# Patient Record
Sex: MALE | Race: WHITE | Employment: FULL TIME | URBAN - METROPOLITAN AREA
[De-identification: names, ages, dates, MRNs, and addresses within clinical notes are randomized per-mention and may not be internally consistent; named-entity substitution may affect disease eponyms.]

---

## 2018-08-07 ENCOUNTER — OFFICE VISIT (OUTPATIENT)
Dept: ORTHOPEDIC SURGERY | Age: 33
End: 2018-08-07

## 2018-08-07 VITALS
DIASTOLIC BLOOD PRESSURE: 62 MMHG | RESPIRATION RATE: 16 BRPM | WEIGHT: 244.8 LBS | HEART RATE: 84 BPM | OXYGEN SATURATION: 100 % | SYSTOLIC BLOOD PRESSURE: 144 MMHG | TEMPERATURE: 97.8 F

## 2018-08-07 DIAGNOSIS — M46.1 BILATERAL SACROILIITIS (HCC): Primary | ICD-10-CM

## 2018-08-07 RX ORDER — PREDNISONE 10 MG/1
TABLET ORAL
Qty: 21 TAB | Refills: 0 | Status: CANCELLED | OUTPATIENT
Start: 2018-08-07

## 2018-08-07 NOTE — PATIENT INSTRUCTIONS
Sacroiliac Joint Pain: Care Instructions  Your Care Instructions    The sacroiliac joints connect the spine and each side of the pelvis. These joints bear the weight and stress of your torso. This makes them easy to injure. Injury or overuse of these joints may cause low back pain. Stress on these joints can cause joint pain. Sacroiliac joint pain is more common in pregnant women. Certain kinds of arthritis also may cause this type of joint pain. Home treatment may help you feel better. So can avoiding activities that stress your back. Your doctor also may recommend physical therapy. This may include doing exercises and stretches to help with pain. You may also learn to use good posture. Follow-up care is a key part of your treatment and safety. Be sure to make and go to all appointments, and call your doctor if you are having problems. It's also a good idea to know your test results and keep a list of the medicines you take. How can you care for yourself at home? · Ask your doctor about light exercises that may help your back pain. Try to do light activity throughout the day. But make sure to take rests as needed. Find a comfortable position for rest, but don't stay in one position for too long. Avoid activities that cause pain. · To apply heat, put a warm water bottle, a heating pad set on low, or a warm cloth on your back. Do not go to sleep with a heating pad on your skin. · Put ice or a cold pack on your back for 10 to 20 minutes at a time. Put a thin cloth between the ice and your skin. · If the doctor gave you a prescription medicine for pain, take it as prescribed. · If you are not taking a prescription pain medicine, ask your doctor if you can take an over-the-counter pain medicine, such as acetaminophen (Tylenol), ibuprofen (Advil, Motrin), or naproxen (Aleve). Read and follow all instructions on the label. Take pain medicines exactly as directed.   · Do not take two or more pain medicines at the same time unless the doctor told you to. Many pain medicines have acetaminophen, which is Tylenol. Too much acetaminophen (Tylenol) can be harmful. · To prevent future back pain, do exercises to stretch and strengthen your back and stomach. Learn how to use good posture, safe lifting techniques, and proper body mechanics. When should you call for help? Call 911 anytime you think you may need emergency care. For example, call if:    · You are unable to move a leg at all.   [facility] your doctor now or seek immediate medical care if:    · You have new or worse symptoms in your legs or buttocks. Symptoms may include:  ¨ Numbness or tingling. ¨ Weakness. ¨ Pain.     · You lose bladder or bowel control.    Watch closely for changes in your health, and be sure to contact your doctor if:    · You are not getting better as expected. Where can you learn more? Go to http://leonaBBC Easyyanelis.info/. Enter T074 in the search box to learn more about \"Sacroiliac Joint Pain: Care Instructions. \"  Current as of: November 29, 2017  Content Version: 11.7  © 8280-5009 [employer]. Care instructions adapted under license by [employer] (which disclaims liability or warranty for this information). If you have questions about a medical condition or this instruction, always ask your healthcare professional. [place] [address] any warranty or liability for your use of this information. Sacroiliac Pain: Exercises  Your Care Instructions  Here are some examples of typical rehabilitation exercises for your condition. Start each exercise slowly. Ease off the exercise if you start to have pain. Your doctor or physical therapist will tell you when you can start these exercises and which ones will work best for you. How to do the exercises  Knee-to-chest stretch    1. Do not do the knee-to-chest exercise if it causes or increases back or leg pain.   2. Lie on your back with your knees bent and your feet flat on the floor. You can put a small pillow under your head and neck if it is more comfortable. 3. Grasp your hands under one knee and bring the knee to your chest, keeping the other foot flat on the floor. 4. Keep your lower back pressed to the floor. Hold for at least 15 to 30 seconds. 5. Relax and lower the knee to the starting position. Repeat with the other leg. 6. Repeat 2 to 4 times with each leg. 7. To get more stretch, keep your other leg flat on the floor while pulling your knee to your chest.  Bridging    1. Lie on your back with both knees bent. Your knees should be bent about 90 degrees. 2. Tighten your belly muscles by pulling in your belly button toward your spine. Then push your feet into the floor, squeeze your buttocks, and lift your hips off the floor until your shoulders, hips, and knees are all in a straight line. 3. Hold for about 6 seconds as you continue to breathe normally, and then slowly lower your hips back down to the floor and rest for up to 10 seconds. 4. Repeat 8 to 12 times. Hip extension    1. Get down on your hands and knees on the floor. 2. Keeping your back and neck straight, lift one leg straight out behind you. When you lift your leg, keep your hips level. Don't let your back twist, and don't let your hip drop toward the floor. 3. Hold for 6 seconds. Repeat 8 to 12 times with each leg. 4. If you feel steady and strong when you do this exercise, you can make it more difficult. To do this, when you lift your leg, also lift the opposite arm straight out in front of you. For example, lift the left leg and the right arm at the same time. (This is sometimes called the \"bird dog exercise. \") Hold for 6 seconds, and repeat 8 to 12 times on each side. Clamshell    1. Lie on your side with a pillow under your head. Keep your feet and knees together and your knees bent. 2. Raise your top knee, but keep your feet together.  Do not let your hips roll back. Your legs should open up like a clamshell. 3. Hold for 6 seconds. 4. Slowly lower your knee back down. Rest for 10 seconds. 5. Repeat 8 to 12 times. 6. Switch to your other side and repeat steps 1 through 5. Hamstring wall stretch    1. Lie on your back in a doorway, with one leg through the open door. 2. Slide your affected leg up the wall to straighten your knee. You should feel a gentle stretch down the back of your leg. 1. Do not arch your back. 2. Do not bend either knee. 3. Keep one heel touching the floor and the other heel touching the wall. Do not point your toes. 3. Hold the stretch for at least 1 minute to begin. Then try to lengthen the time you hold the stretch to as long as 6 minutes. 4. Switch legs, and repeat steps 1 through 3.  5. Repeat 2 to 4 times. 6. If you do not have a place to do this exercise in a doorway, there is another way to do it:  7. Lie on your back, and bend one knee. 8. Loop a towel under the ball and toes of that foot, and hold the ends of the towel in your hands. 9. Straighten your knee, and slowly pull back on the towel. You should feel a gentle stretch down the back of your leg. 10. Switch legs, and repeat steps 1 through 3.  11. Repeat 2 to 4 times. Lower abdominal strengthening    1. Lie on your back with your knees bent and your feet flat on the floor. 2. Tighten your belly muscles by pulling your belly button in toward your spine. 3. Lift one foot off the floor and bring your knee toward your chest, so that your knee is straight above your hip and your leg is bent like the letter \"L. \"  4. Lift the other knee up to the same position. 5. Lower one leg at a time to the starting position. 6. Keep alternating legs until you have lifted each leg 8 to 12 times. 7. Be sure to keep your belly muscles tight and your back still as you are moving your legs. Be sure to breathe normally. Piriformis stretch    1.  Lie on your back with your legs straight. 2. Lift your affected leg, and bend your knee. With your opposite hand, reach across your body, and then gently pull your knee toward your opposite shoulder. 3. Hold the stretch for 15 to 30 seconds. 4. Switch legs and repeat steps 1 through 3.  5. Repeat 2 to 4 times. Follow-up care is a key part of your treatment and safety. Be sure to make and go to all appointments, and call your doctor if you are having problems. It's also a good idea to know your test results and keep a list of the medicines you take. Where can you learn more? Go to http://leona-yanelis.info/. Enter N496 in the search box to learn more about \"Sacroiliac Pain: Exercises. \"  Current as of: November 29, 2017  Content Version: 11.7  © 3015-3124 [employer]. Care instructions adapted under license by [employer] (which disclaims liability or warranty for this information). If you have questions about a medical condition or this instruction, always ask your healthcare professional. [address] any warranty or liability for your use of this information.

## 2018-08-07 NOTE — PROGRESS NOTES
[facility]  [address]  [place], [address] [place] [address] Street  Phone: [phone]  Fax: [phone]        Desmond Mcclendon  : 1985  PCP: No primary care provider on file. 2018    NEW PATIENT      ASSESSMENT AND PLAN     Duke Alfaro comes in to the office today c/o axial low back pain following an injury on  when he lifted a trailer. He was evaluated at [facility], where they suspected his pain was likely due to a muscle spasm. His symptoms have not improved much since the injury. His pain worsens movement or lying on the ground and is relieved with sitting. He also has some pain in his abdomen/groin that feels like \"a stomach ache. \" He rates his pain as a 4/10 today. On examination, he has pain reproduced with lumbar extension. He is neurologically intact. He had no dural tension sign. He had tenderness to palpation of his SI joints bilaterally. He had 2/5 positive provocative testing for sacroiliitis. His pain is likely sacroiliitis bilaterally. The referral pattern would explain his groin symptoms and exacerbation with extension. We discussed the option of PT vs SI joint injection. I offered a prednisone dose pack, but he defers at this time. I referred to PT. Pt will f/u in 6 weeks or sooner if needed. Diagnoses and all orders for this visit:    1. Bilateral sacroiliitis ([facility] [address]  -     REFERRAL TO PHYSICAL THERAPY       Follow-up Disposition: Not on File    CHIEF COMPLAINT  Duke Alfaro is seen today in consultation at the request of No primary care provider on file. for complaints of axial low back pain. HISTORY OF PRESENT ILLNESS  Duke Alfaro is a 29 y.o. male c/o axial low back pain after an injury in . , he lifted a trailer and \"threw out [his] back. \" He went to [facility] for evaluation who took an XR and stated it was likely a muscle spasm.  Since his pain has only slightly improved since the injury, he would like further evaluation. He also has some pain in his abdomen/groin that feels like \"a stomach ache. \"    He notes he feels more comfortable lying on his side in bed, but lying on the floor while playing with his kids worsens the pain. He finds relief from sitting in a supportive chair. He has pain when he has bowel movements, but notes the pain is not hindering his function. He has no hx of back pain prior to the injury. He works as a . Pt denies any fevers, chills, nausea, vomiting. Pt denies any chest pain and shortness of breath. Pt denies any ear, nose, and throat problems. Pt denies any fecal or urinary incontinence. PAST MEDICAL HISTORY   No past medical history on file. No past surgical history on file. MEDICATIONS        ALLERGIES  Allergies not on file       SOCIAL HISTORY    Social History     Social History    Marital status: UNKNOWN     Spouse name: N/A    Number of children: N/A    Years of education: N/A     Social History Main Topics    Smoking status: Not on file    Smokeless tobacco: Not on file    Alcohol use Not on file    Drug use: Not on file    Sexual activity: Not on file     Other Topics Concern    Not on file     Social History Narrative       FAMILY HISTORY  No family history on file. REVIEW OF SYSTEMS  Review of Systems   Musculoskeletal: Positive for back pain. PHYSICAL EXAMINATION  There were no vitals taken for this visit. No flowsheet data found. Constitutional:  Well developed, well nourished, in no acute distress. Psychiatric: Affect and mood are appropriate. HEENT: Normocephalic, atraumatic. Extraocular movements intact. Integumentary: No rashes or abrasions noted on exposed areas. Cardiovascular: Regular rate and rhythm. Pulmonary: Clear to auscultation bilaterally. SPINE/MUSCULOSKELETAL EXAM    Cervical spine:  Neck is midline. Normal muscle tone. No focal atrophy is noted.    ROM pain free. Shoulder ROM intact. No tenderness to palpation. Negative Spurling's sign. Negative Tinel's sign. Negative Moulton's sign. Sensation in the bilateral arms grossly intact to light touch. Lumbar spine:  No rash, ecchymosis, or gross obliquity. No fasciculations. No focal atrophy is noted. No pain with hip ROM. Full range of motion. No tenderness to palpation. No tenderness to palpation at the sciatic notch. SI joints tender bilaterally. Trochanters non tender. Sensation in the bilateral legs grossly intact to light touch. Positive CHAVA test on the R>L. Negative P4 bilaterally. Negative distraction test.  Mildly positive compression test.  Negative Gaenslen test bilaterally. MOTOR:      Biceps  Triceps Deltoids Wrist Ext Wrist Flex Hand Intrin   Right 5/5 5/5 5/5 5/5 5/5 5/5   Left 5/5 5/5 5/5 5/5 5/5 5/5             Hip Flex  Quads Hamstrings Ankle DF EHL Ankle PF   Right 5/5 5/5 5/5 5/5 5/5 5/5   Left 5/5 5/5 5/5 5/5 5/5 5/5     DTRs are 2+ biceps, triceps, brachioradialis, patella, and Achilles. Negative Straight Leg raise. Squat not tested. No difficulty with tandem gait. Ambulation without assistive device. FWB. RADIOGRAPHS  5V Lumbar XR images taken on 7/3/18 personally reviewed with patient:  There is slight disc space narrowing at L4-5 and L5-S1. No acute abnormality.  reviewed    Mr. Husam Hogue has a reminder for a \"due or due soon\" health maintenance. I have asked that he contact his primary care provider for follow-up on this health maintenance. 20 minutes of face-to-face contact were spent with the patient during today's visit extensively discussing symptoms and treatment plan. All questions were answered. More than half of this visit today was spent on counseling. Written by Ej Lay, as dictated by Dr. Elmira Kearney. I, Dr. Elmira Kearney, confirm that all documentation is accurate.

## 2018-08-08 ENCOUNTER — DOCUMENTATION ONLY (OUTPATIENT)
Dept: ORTHOPEDIC SURGERY | Age: 33
End: 2018-08-08

## 2018-08-08 NOTE — PROGRESS NOTES
Pend: PATIENT WILL CALL THE OFFICE WITH HIS W/C INFORMATION PER ANJELICA MCGRATH.  08/08/2018 [email or web address] NOTE  PRIMARY HOLD  Informational message attached either in claim notes or by remit. [INFORM]  CALLED PATIENT AT [phone]. HE STATES HE GAVE CONTACT INFORMATION TO SOMEONE ELSE YESTERDAY??? HIS MANAGER IS GOING TO CALL ME TOMORROW. HE HAS MY DIRECT PHONE NUMBER.  Emery Yepez

## 2018-08-13 ENCOUNTER — HOSPITAL ENCOUNTER (OUTPATIENT)
Dept: PHYSICAL THERAPY | Age: 33
Discharge: HOME | End: 2018-08-13
Payer: OTHER MISCELLANEOUS

## 2018-08-13 PROCEDURE — 97110 THERAPEUTIC EXERCISES: CPT

## 2018-08-13 PROCEDURE — 97161 PT EVAL LOW COMPLEX 20 MIN: CPT

## 2018-08-13 NOTE — PROGRESS NOTES
PT DAILY TREATMENT NOTE/LUMBAR EVAL 3-16    Patient Name: Zak Jo  Date:2018  : 1985  [x]  Patient  Verified  Payor: Kelli Lock / Plan: [id number] [facility] / Product Type: Workers Comp /    In [employer] time:358  Total Treatment Time (min): 40  Total Timed Codes (min): 8  1:1 Treatment Time ( only): 40   Visit #: 1 of 10    Treatment Area: Sacroiliitis ([facility] [address] [M46.1]  SUBJECTIVE  Pain Level (0-10 scale): 2-3  [x]constant []intermittent [x]improving []worsening []no change since onset  WORSE with movement , leaning backward, standing, leaning forward holding weight, BETTER having my back supported, ibuprofin, heat  Any medication changes, allergies to medications, adverse drug reactions, diagnosis change, or new procedure performed?: [x] No    [] Yes (see summary sheet for update)  Subjective functional status/changes:     PLOF:I all areas, no AD use,I ADLS and activities, household chores, work demands, community activities  Limitations to PLOF: pain  Mechanism of Injury: work related injury 18 picking up too much weight and incorrectly  Current symptoms/Complaints: 30 YO male diagnosed as above and with S/S consistent with above diagnosis presents to skilled outpatient PT. CCO lower back pain, mostly along the base of the spine. Sometimes it transfers to the front to the groin area faintly and in between the pelvic region. Onset 18 picking up too much weight and incorrectly while at work.  Pain today is 2-3/10     Previous Treatment/Compliance: [facility],  [address], x rays, medication, heat,   PMHx/Surgical Hx: none  Work Hx: [address] [facility], [address] [place] [address],   Living Situation: lives in a 1 story house, 2-3 steps to enter, not alone  Pt Goals: strengthen my back  Barriers: [x]pain []financial []time []transportation []other  Motivation: good  Substance use: []Alcohol []Tobacco []other:   FABQ Score: []low []elevate  Cognition: A & O x 4 Other: OBJECTIVE/EXAMINATION  Domestic Life: household chores, work demands, community activities  Activity/Recreational Limitations: pain   Mobility: I   Self Care: I         Modality rationale:     Min Type Additional Details    [] Estim:  []Unatt       []IFC  []Premod                        []Other:  []w/ice   []w/heat  Position:  Location:    [] Estim: []Att    []TENS instruct  []NMES                    []Other:  []w/US   []w/ice   []w/heat  Position:  Location:    []  Traction: [] Cervical       []Lumbar                       [] Prone          []Supine                       []Intermittent   []Continuous Lbs:  [] before manual  [] after manual    []  Ultrasound: []Continuous   [] Pulsed                           []1MHz   []3MHz Location:  W/cm2:    []  Iontophoresis with dexamethasone         Location: [] Take home patch   [] In clinic    []  Ice     []  heat  []  Ice massage  []  Laser   []  Anodyne Position:  Location:    []  Laser with stim  []  Other: Position:  Location:    []  Vasopneumatic Device Pressure:       [] lo [] med [] hi   Temperature: [] lo [] med [] hi   [] Skin assessment post-treatment:  []intact []redness- no adverse reaction    []redness  adverse reaction:     32 min [x]Eval                  []Re-Eval       8 min Therapeutic Exercise:  [x] See flow sheet :   Rationale: increase ROM, increase strength and improve coordination to improve the patients ability to aid with increase tolerance to ADLs and activities     min Therapeutic Activity:  []  See flow sheet :   Rationale:   to improve the patients ability to       min Neuromuscular Re-education:  []  See flow sheet :   Rationale:   to improve the patients ability to      min Manual Therapy:     Rationale:  to      min Gait Training:  ___ feet with ___ device on level surfaces with ___ level of assist   Rationale:           With   [] TE   [] TA   [] neuro   [] other: Patient Education: [x] Review HEP    [] Progressed/Changed HEP based on:   [] positioning   [] body mechanics   [] transfers   [] heat/ice application    [] other:      Other Objective/Functional Measures:    Physical Therapy Evaluation - Lumbar Spine ([facility])    SUBJECTIVE  Chief Complaint:    Mechanism of injury:    Symptoms:  Aggravated by:   [] Bending [] Sitting [] Standing [] Walking   [] Moving [] Cough [] Sneeze [] Valsalva   [] AM  [] PM  Lying:  [] sup   [] pro   [] sidelying   [] Other:     Eased by:    [] Bending [] Sitting [] Standing [] Walking   [] Moving [] AM  [] PM  Lying: [] sup  [] pro  [] sidelying   [] Other:     General Health:  Red Flags Indicated? [] Yes    [] No  [] Yes [] No Recent weight change (If yes, due to dieting?  [] Yes  [] No)   [] Yes [] No Weakness in legs during walking  [] Yes [] No Unremitting pain at night  [] Yes [] No Abdominal pain or problems  [] Yes [] No Rectal bleeding  [] Yes [] No Feet more cold or painful in cold weather  [] Yes [] No Menstrual irregularities  [] Yes [] No Blood or pain with urination  [] Yes [] No Dysfunction of bowel or bladder  [] Yes [] No Recent illness within past 3 weeks (i.e, cold, flu)  [] Yes [] No Numbness/tingling in buttock/genitalia region    Past History/Treatments:     Diagnostic Tests: [] Lab work [x] X-rays    [] CT [] MRI     [] Other:  Results:    Functional Status  Prior level of function:as above  Present functional limitations:FOTO   What position do you sleep in?: SL    OBJECTIVE  Posture:  Lateral Shift: [x] R    [x] L     [] +  [x] -  Kyphosis: [] Increased [] Decreased   []  WNL  Lordosis:  [] Increased [] Decreased   [x] WNL  Pelvic symmetry: [x] WNL    [] Other:minimal decrease left inonimant mobility with FF    Gait:  [x] Normal     [] Abnormal:    Active Movements: [] N/A   [] Too acute   [] Other:  ROM  AROM % PROM Comments:pain, area   Forward flexion 40-60 11 cm     Extension 20-30 15 degrees      SB right 20-30 45 cm     SB left 20-30 51 cm     Rotation right 5-10 75%  Pain Left rot > right rot   Rotation left 5-10 100%  Pain left rot > right rot     Repeated Movements   Effects on present pain: produces ([place]), abolishes (A), increases (incr), decreases (decr), centralizes (C), peripheral (PH), no effect (NE)   Pre-Test Sx Flexion Repeated Flexion Extension Repeated Extension Repeated SBL Repeated SBR   Sitting          Standing          Lying      N/A N/A   Comments:  Side Glide:  Sustained passive positioning test:    Neuro Screen [] WNL  Myotome/Dermatome/Reflexes:  Comments:    Dural Mobility:  SLR Sitting: [] R    [] L    [] +    [] -  @ (degrees):           Supine: [] R    [] L    [] +    [] -  @ (degrees):   Slump Test: [x] R    [x] L    [] +    [x] -  @ (degrees):   Prone Knee Bend: [] R    [] L    [] +    [] -     Palpation  [] Min  [x] Mod  [] Severe    Location:Left SIJ  [] Min  [x] Mod  [] Severe    Location:STC Left SIJ, Superior glut   [] Min  [] Mod  [] Severe    Location:    Strength   L(0-5) R (0-5) N/T   Hip Flexion (L1,2)   []   Knee Extension (L3,4)   []   Ankle Dorsiflexion (L4)   []   Great Toe Extension (L5)   []   Ankle Plantarflexion (S1)   []   Knee Flexion (S1,2)   []   Upper Abdominals   []   Lower Abdominals   []   Paraspinals   []   Back Rotators   []   Gluteus Miguel   []   Other   []     Special Tests  Lumbar:  Lumb.  Compression: [] Pos  [] Neg               Lumbar Distraction:   [] Pos  [] Neg    Quadrant:  [] Pos  [] Neg   [] Flex  [] Ext    Sacroilliac:  Gaenslen's: [] R    [] L    [] +    [] -     Compression: [] +    [] -     Gapping:  [] +    [] -     Thigh Thrust: [] R    [] L    [] +    [] -     Leg Length: [] +    [] -   Position:    Crests:    ASIS:    PSIS:    Sacral Sulcus:    Mobility: Standing flex:     Sitting flex:     Supine to sit:     Prone knee bend:         Hip: Rolm Nba:  [] R    [] L    [] +    [] -     Scour:  [] R    [] L    [] +    [] -     Piriformis: [x] R    [x] L    [] +    [x] -          Deficits: Scott's: [] R    [] L    [] + [] -     [place]: [] R    [] L    [] +    [] -     Hamstrings 90/90:    Gastrocsoleus (to neutral): Right: Left:       Global Muscular Weakness:  Abdominals:  Quadratus Lumborum:  Paraspinals: Other:    Other tests/comments:       Pain Level (0-10 scale) post treatment: 2-3  ASSESSMENT/Changes in Function: Patient demonstrates the potential to make gains with improved ROM, strength, endurance/activity tolerance, functional FOTO survey score  and all within a reasonable time frame so as to increase their functional independence with ADLs and activities for carryover to  Improve quality of life, tolerance to household chores and community activities. Patient requires skilled Physical Therapy so as to monitor their response to and modify their treatment plan accordingly. Patient appears to be an appropriate candidate for skilled outpatient Physical Therapy. Patient will continue to benefit from skilled PT services to modify and progress therapeutic interventions, address ROM deficits, address strength deficits, analyze and address soft tissue restrictions, analyze and cue movement patterns, analyze and modify body mechanics/ergonomics, assess and modify postural abnormalities and instruct in home and community integration to attain remaining goals.      [x]  See Plan of Care  []  See progress note/recertification  []  See Discharge Summary         Progress towards goals / Updated goals:       PLAN  [x]  Upgrade activities as tolerated     [x]  Continue plan of care  []  Update interventions per flow sheet       []  Discharge due to:_  []  Other:_      Kandace Darling, PT 8/13/2018  3:19 PM

## 2018-08-13 NOTE — PROGRESS NOTES
[facility] [address], [address] [place] [address], [address],[place] [address]  [phone] fax      Plan of Care/ Statement of Necessity for Physical Therapy Services    Patient name: Indra Mccallum Start of Care: 2018   Referral source: Hannah Foote MD : 1985    Medical Diagnosis: Sacroiliitis ([facility] [address] [M46.1]   Onset Date:18    Treatment Diagnosis: decrease tolerance to ADLs and activities due to LBP, decrease trunk ROM, decrease core strength, STC Left > right LS   Prior Hospitalization: see medical history Provider#: [id number]   Medications: Verified on Patient summary List    Comorbidities: none   Prior Level of Function: :I all areas, no AD use,I ADLS and activities, household chores, work demands, community activities     The Plan of Care and following information is based on the information from the initial evaluation. Assessment/ key information: 28 YO male diagnosed as above and with S/S consistent with above diagnosis presents to skilled outpatient PT. CCO lower back pain, mostly along the base of the spine. Sometimes it transfers to the front to the groin area faintly and in between the pelvic region. Onset 18 picking up too much weight and incorrectly while at work.  Pain today is 2-3/10  Previous Treatment/Compliance: [facility],  [address], x rays, medication, heat,   Pain 2-3, - lateral shift, Lordosis WNL, Pelvic symmetry WNL with minimal decrease left inonimant rotation with FF, gait is normal, no AD use, FOTO 61,  TRUNK AROM  FF 11 cm, BB 15 degrees, ROT Right 75% left 100%, SB right 45 cm, left 51 cm,  Pain with left rotation > right rotation.- B slump sit,  - B piriformis tension, Patient demonstrates the potential to make gains with improved ROM, strength, endurance/activity tolerance, functional FOTO survey score  and all within a reasonable time frame so as to increase their functional independence with ADLs and activities for carryover to  Improve quality of life, tolerance to household chores and community activities. Patient requires skilled Physical Therapy so as to monitor their response to and modify their treatment plan accordingly. Patient appears to be an appropriate candidate for skilled outpatient Physical Therapy.     Evaluation Complexity History LOW Complexity : Zero comorbidities / personal factors that will impact the outcome / POC; Examination MEDIUM Complexity : 3 Standardized tests and measures addressing body structure, function, activity limitation and / or participation in recreation  ;Presentation LOW Complexity : Stable, uncomplicated  ;Clinical Decision Making MEDIUM Complexity : FOTO score of 26-74  Overall Complexity Rating: LOW   Problem List: pain affecting function, decrease ROM, decrease strength, decrease ADL/ functional abilitiies, decrease activity tolerance, decrease flexibility/ joint mobility and other FOTO 61   Treatment Plan may include any combination of the following: Therapeutic exercise, Therapeutic activities, Neuromuscular re-education, Physical agent/modality, Manual therapy, Patient education and Home safety training  Patient / Family readiness to learn indicated by: asking questions, trying to perform skills and interest  Persons(s) to be included in education: patient (P)  Barriers to Learning/Limitations: None  Patient Goal (s): strengthen my back  Patient Self Reported Health Status: good  Rehabilitation Potential: good    Short Term Goals: To be accomplished in 5 treatments:   1 patient will have established and be I with HEP to aid with progression of skilled PT program   EVAL issued   CURRENT   2 patient will have FOTO improved to 66 to show increase tolerance to work demands   EVAL 66   CURRENT     Long Term Goals:  To be accomplished in 10 treatments:   1 patient will have FOTO improved to 72 to show increase tolerance to work demands   EVAL 66   CURRENT   2 patient will have pain 0-1/10 to aid with increase tolerance to ADLs and activities at home and work   EVAL 2-3   CURRENT   3 patient will have overall 50% improvement per his reports to aid with increase tolerance to work demands and household chores   EVAL pain with standing and moving and leaning backward   CURRENT  Frequency / Duration: Patient to be seen 2-3 times per week for 10 treatments. Patient/ Caregiver education and instruction: Diagnosis, prognosis, self care, activity modification and exercises   [x]  Plan of care has been reviewed with EAGLE Landrum, PT 8/13/2018 3:19 PM  ________________________________________________________________________    I certify that the above Therapy Services are being furnished while the patient is under my care. I agree with the treatment plan and certify that this therapy is necessary.     Physician's Signature:____________Date:_________TIME:________    [employer], Date and Time must be completed for valid certification **      Please sign and return to [facility] . Erica Mcdonaldawdeep [address] [place] [address], [address] [place] [address],[facility] [address]  [phone] fax

## 2018-08-17 ENCOUNTER — HOSPITAL ENCOUNTER (OUTPATIENT)
Dept: PHYSICAL THERAPY | Age: 33
Discharge: HOME | End: 2018-08-17
Payer: OTHER MISCELLANEOUS

## 2018-08-17 PROCEDURE — 97110 THERAPEUTIC EXERCISES: CPT

## 2018-08-17 NOTE — PROGRESS NOTES
PT DAILY TREATMENT NOTE - [facility]     Patient Name: Bhavesh Mcdonald  Date:2018  : 1985  [x]  Patient  Verified  Payor: [employer] / Plan: [id number] / Product Type: Workers Comp /    In [employer] time:3:15  Total Treatment Time (min): 45  Total Timed Codes (min): 45  Visit #: 2 of 10    [facility] [M46.1]    SUBJECTIVE  Pain Level (0-10 scale): 0  Any medication changes, allergies to medications, adverse drug reactions, diagnosis change, or new procedure performed?: [x] No    [] Yes (see summary sheet for update)  Subjective functional status/changes:   [] No changes reported  Reports having no pain today    OBJECTIVE          45 min Therapeutic Exercise:  [x] See flow sheet :   Rationale: increase ROM, increase strength and increase proprioception to improve the patients ability to perform daily household chores. With   [] TE   [] TA   [] neuro   [] other: Patient Education: [x] Review HEP    [] Progressed/Changed HEP based on:   [] positioning   [] body mechanics   [] transfers   [] heat/ice application    [] other:      Other Objective/Functional Measures: Tolerated initiation of therex well with no changes in symptoms     Pain Level (0-10 scale) post treatment: 0    ASSESSMENT/Changes in Function: Patient continues to show improvements with signs/ symptoms however still demonstrates a decrease in strength, deficits with balance and an increase in pain with functional activities. Patient will continue to benefit from skilled PT services to modify and progress therapeutic interventions, address functional mobility deficits, address strength deficits, analyze and cue movement patterns and analyze and modify body mechanics/ergonomics to attain remaining goals.      [x]  See Plan of Care  []  See progress note/recertification  []  See Discharge Summary         Progress towards goals / Updated goals:  Short Term Goals: To be accomplished in 5 treatments:                         1 patient will have established and be I with HEP to aid with progression of skilled PT program                         EVAL issued                         CURRENT: MET- pain with extension exercise 8/17/18                         2 patient will have FOTO improved to 66 to show increase tolerance to work demands                         EVAL 66                         CURRENT                           Long Term Goals:  To be accomplished in 10 treatments:                         1 patient will have FOTO improved to 72 to show increase tolerance to work demands                         EVAL 66                         CURRENT                         2 patient will have pain 0-1/10 to aid with increase tolerance to ADLs and activities at home and work                         EVAL 2-3                         CURRENT                         3 patient will have overall 50% improvement per his reports to aid with increase tolerance to work demands and household chores                         EVAL pain with standing and moving and leaning backward                         CURRENT    PLAN  []  Upgrade activities as tolerated     [x]  Continue plan of care  []  Update interventions per flow sheet       []  Discharge due to:_  []  Other:_      Randy Peoples PT 8/17/2018  8:04 AM    Future Appointments  Date Time Provider Gay Goodman   8/17/2018 2:30 PM Randy Peoples PT [facility]   8/21/2018 12:00 PM Vy Andujar, PTA [facility]   8/23/2018 11:00 AM Randy Peoples PT [facility]   8/27/2018 7:30 AM Vy Andujar, PTA [facility]   8/30/2018 3:00 PM Vy Andujar PTA [facility]

## 2018-08-21 ENCOUNTER — APPOINTMENT (OUTPATIENT)
Dept: PHYSICAL THERAPY | Age: 33
End: 2018-08-21
Payer: OTHER MISCELLANEOUS

## 2018-08-22 ENCOUNTER — HOSPITAL ENCOUNTER (OUTPATIENT)
Dept: PHYSICAL THERAPY | Age: 33
Discharge: HOME | End: 2018-08-22
Payer: OTHER MISCELLANEOUS

## 2018-08-22 ENCOUNTER — APPOINTMENT (OUTPATIENT)
Dept: PHYSICAL THERAPY | Age: 33
End: 2018-08-22

## 2018-08-22 PROCEDURE — 97110 THERAPEUTIC EXERCISES: CPT

## 2018-08-22 PROCEDURE — 97140 MANUAL THERAPY 1/> REGIONS: CPT

## 2018-08-22 NOTE — PROGRESS NOTES
PT DAILY TREATMENT NOTE     Patient Name: Placido Leo  Date:2018  : 1985  [x]  Patient  Verified  Payor: Whitney Mckeon / Plan: [id number] / Product Type: Workers Comp /    In time:4:10  Out time:5:00  Total Treatment Time (min):49   Visit #: 3 of 10    Treatment Area: Sacroiliitis ([address] [M46.1]    SUBJECTIVE  Pain Level (0-10 scale): 2  Any medication changes, allergies to medications, adverse drug reactions, diagnosis change, or new procedure performed?: [x] No    [] Yes (see summary sheet for update)  Subjective functional status/changes:   [] No changes reported  Little pain.     OBJECTIVE    Modality rationale: decrease edema, decrease inflammation, decrease pain and increase tissue extensibility to improve the patients ability to perform ADL    Min Type Additional Details    [] Estim:  []Unatt       []IFC  []Premod                        []Other:  []w/ice   []w/heat  Position:  Location:    [] Estim: []Att    []TENS instruct  []NMES                    []Other:  []w/US   []w/ice   []w/heat  Position:  Location:    []  Traction: [] Cervical       []Lumbar                       [] Prone          []Supine                       []Intermittent   []Continuous Lbs:  [] before manual  [] after manual    []  Ultrasound: []Continuous   [] Pulsed                           []1MHz   []3MHz W/cm2:  Location:    []  Iontophoresis with dexamethasone         Location: [] Take home patch   [] In clinic    []  Ice     []  heat  []  Ice massage  []  Laser   []  Anodyne Position:  Location:    []  Laser with stim  []  Other:  Position:  Location:    []  Vasopneumatic Device Pressure:       [] lo [] med [] hi   Temperature: [] lo [] med [] hi   [x] Skin assessment post-treatment:  [x]intact []redness- no adverse reaction    []redness  adverse reaction:      min []Eval                  []Re-Eval       41 min Therapeutic Exercise:  [x] See flow sheet :   Rationale: increase ROM and increase strength to improve the patients ability to perform ADL     min Therapeutic Activity:  []  See flow sheet :   Rationale:   to improve the patients ability to       min Neuromuscular Re-education:  []  See flow sheet :   Rationale:   to improve the patients ability to     8 min Manual Therapy:  P/A mob  (B) LSP   Rationale: decrease pain, increase ROM, increase tissue extensibility and decrease edema  to perform ADL     min Gait Training:  ___ feet with ___ device on level surfaces with ___ level of assist   Rationale: With   [x] TE   [] TA   [] neuro   [] other: Patient Education: [x] Review HEP    [] Progressed/Changed HEP based on:   [] positioning   [] body mechanics   [] transfers   [] heat/ice application    [] other:      Other Objective/Functional Measures:  Pain  When  Seated  Unsupported/prolonged  standing    Pain Level (0-10 scale) post treatment: 0    ASSESSMENT/Changes in Function:  Tightness and  TTP  At  (B) LSP. Overall fair response  To each there ex. Patient will continue to benefit from skilled PT services to address functional mobility deficits, address ROM deficits, address strength deficits, analyze and address soft tissue restrictions, analyze and cue movement patterns and instruct in home and community integration to attain remaining goals.      [x]  See Plan of Care  []  See progress note/recertification  []  See Discharge Summary         Progress towards goals / Updated goals:  Short Term Goals: To be accomplished in 5 treatments:                         7 patient will have established and be I with HEP to aid with progression of skilled PT program                         EVAL issued                         [id number]: MET- pain with extension exercise 8/17/18                         2 patient will have FOTO improved to 66 to show increase tolerance to work demands                         EVAL 12 [facility] be accomplished in 10 treatments:                         3 patient will have FOTO improved to 72 to show increase tolerance to work demands                         EVAL [phone] patient will have pain 0-1/10 to aid with increase tolerance to ADLs and activities at home and work                         EVAL 2-3                         CURRENT  2  8/22/18                         3 patient will have overall 50% improvement per his reports to aid with increase tolerance to work demands and household chores                         EVAL pain with standing and moving and leaning backward                         CURRENT    PLAN  []  Upgrade activities as tolerated     []  Continue plan of care  []  Update interventions per flow sheet       []  Discharge due to:_  []  Other:_      Lynn Mckinley PTA 8/22/2018  4:16 PM    Future Appointments  Date Time Provider Gay Goodman   8/27/2018 7:30 AM Vy Andujar PTA [facility]   8/30/2018 3:00 PM EAGLE Silva [facility]

## 2018-08-23 ENCOUNTER — APPOINTMENT (OUTPATIENT)
Dept: PHYSICAL THERAPY | Age: 33
End: 2018-08-23
Payer: OTHER MISCELLANEOUS

## 2018-08-27 ENCOUNTER — HOSPITAL ENCOUNTER (OUTPATIENT)
Dept: PHYSICAL THERAPY | Age: 33
Discharge: HOME | End: 2018-08-27
Payer: OTHER MISCELLANEOUS

## 2018-08-27 PROCEDURE — 97110 THERAPEUTIC EXERCISES: CPT

## 2018-08-27 NOTE — PROGRESS NOTES
PT DAILY TREATMENT NOTE     Patient Name: Jyoti More  Date:2018  : 1985  [x]  Patient  Verified  Payor: Eun Province / Plan: [id number] [place] [address] Avenue / Product Type: Workers Comp /    In [facility]  Total Treatment Time (min): 49  Visit #: 4 of 10    Treatment [facility]: [facility] [M46.1]    SUBJECTIVE  Pain Level (0-10 scale): 0  Any medication changes, allergies to medications, adverse drug reactions, diagnosis change, or new procedure performed?: [x] No    [] Yes (see summary sheet for update)  Subjective functional status/changes:   [] No changes reported  Felt  Good  Since  Last  Session.     OBJECTIVE    Modality rationale: decrease edema, decrease inflammation, decrease pain and increase tissue extensibility to improve the patients ability to perform ADL    Min Type Additional Details    [] Estim:  []Unatt       []IFC  []Premod                        []Other:  []w/ice   []w/heat  Position:  Location:    [] Estim: []Att    []TENS instruct  []NMES                    []Other:  []w/US   []w/ice   []w/heat  Position:  Location:    []  Traction: [] Cervical       []Lumbar                       [] Prone          []Supine                       []Intermittent   []Continuous Lbs:  [] before manual  [] after manual    []  Ultrasound: []Continuous   [] Pulsed                           []1MHz   []3MHz W/cm2:  Location:    []  Iontophoresis with dexamethasone         Location: [] Take home patch   [] In clinic   10 [x]  Ice  post   []  heat  []  Ice massage  []  Laser   []  Anodyne Position:long  sit  Location:(B) LSP    []  Laser with stim  []  Other:  Position:  Location:    []  Vasopneumatic Device Pressure:       [] lo [] med [] hi   Temperature: [] lo [] med [] hi   [x] Skin assessment post-treatment:  [x]intact []redness- no adverse reaction    []redness  adverse reaction:      min []Eval                  []Re-Eval       39 min Therapeutic Exercise:  [x] See flow sheet :   Rationale: increase ROM and increase strength to improve the patients ability to perform ADL      min Therapeutic Activity:  []  See flow sheet :   Rationale:   to improve the patients ability to       min Neuromuscular Re-education:  []  See flow sheet :   Rationale:   to improve the patients ability to      min Manual Therapy:     Rationale: decrease pain, increase ROM, increase tissue extensibility and decrease edema  to perform ADL      min Gait Training:  ___ feet with ___ device on level surfaces with ___ level of assist   Rationale: With   [x] TE   [] TA   [] neuro   [] other: Patient Education: [x] Review HEP    [] Progressed/Changed HEP based on:   [] positioning   [] body mechanics   [] transfers   [] heat/ice application    [] other:      Other Objective/Functional Measures:  Pt  Experienced  Popping  At  Right  Hip  With LBR    Pain Level (0-10 scale) post treatment: 0    ASSESSMENT/Changes in Function: OVerall fair  Response  To each there ex. Patient will continue to benefit from skilled PT services to address functional mobility deficits, address ROM deficits, address strength deficits, analyze and address soft tissue restrictions, analyze and cue movement patterns and instruct in home and community integration to attain remaining goals.      [x]  See Plan of Care  []  See progress note/recertification  []  See Discharge Summary         Progress towards goals / Updated goals:  Short Term Goals: To be accomplished in 5 treatments:                         7 patient will have established and be I with HEP to aid with progression of skilled PT program                         EVAL issued                         [id number]: MET- pain with extension exercise 8/17/18                         2 patient will have FOTO improved to 66 to show increase tolerance to work demands                         EVAL [facility] be accomplished in 10 treatments:                         0 patient will have FOTO improved to 72 to show increase tolerance to work demands                         EVAL [id number] [id number] patient will have pain 0-1/10 to aid with increase tolerance to ADLs and activities at home and work                         EVAL 2-3                         CURRENT  2  8/22/18   0   8/27/18                         3 patient will have overall 50% improvement per his reports to aid with increase tolerance to work demands and household chores                         EVAL pain with standing and moving and leaning backward                         CURRENT    PLAN  []  Upgrade activities as tolerated     []  Continue plan of care  []  Update interventions per flow sheet       []  Discharge due to:_  []  Other:_      Frantz Cormier PTA 8/27/2018  7:45 AM    Future Appointments  Date Time Provider Gay Goodman   8/30/2018 3:00 PM Vy Farmer PTA [facility]

## 2018-08-30 ENCOUNTER — HOSPITAL ENCOUNTER (OUTPATIENT)
Dept: PHYSICAL THERAPY | Age: 33
Discharge: HOME | End: 2018-08-30
Payer: OTHER MISCELLANEOUS

## 2018-08-30 PROCEDURE — 97110 THERAPEUTIC EXERCISES: CPT

## 2018-08-30 NOTE — PROGRESS NOTES
PT DAILY TREATMENT NOTE  Patient Name: Placido Leo 
Date:2018 : 1985 [x]  Patient  Verified Payor: Whitney Mckeon / Plan: [address] / Product Type: Workers Comp / In time: 3:08  Out time:3:55 Total Treatment Time (min): 44 Visit #: 5 of 10 Treatment Area: Sacroiliitis ([address] [M46.1] SUBJECTIVE Pain Level (0-10 scale):0 Any medication changes, allergies to medications, adverse drug reactions, diagnosis change, or new procedure performed?: [x] No    [] Yes (see summary sheet for update) Subjective functional status/changes:   [] No changes reported No pain. OBJECTIVE Modality rationale: decrease edema, decrease inflammation, decrease pain and increase tissue extensibility to improve the patients ability to perform ADL Min Type Additional Details  
 [] Estim:  []Unatt       []IFC  []Premod []Other:  []w/ice   []w/heat Position: Location:  
 [] Estim: []Att    []TENS instruct  []NMES []Other:  []w/US   []w/ice   []w/heat Position: Location:  
 []  Traction: [] Cervical       []Lumbar 
                     [] Prone          []Supine []Intermittent   []Continuous Lbs: 
[] before manual 
[] after manual  
 []  Ultrasound: []Continuous   [] Pulsed []1MHz   []3MHz W/cm2: 
Location:  
 []  Iontophoresis with dexamethasone Location: [] Take home patch  
[] In clinic  
10 [x]  Ice post    []  heat 
[]  Ice massage 
[]  Laser  
[]  Anodyne Position:long sit Location:(B) LSP []  Laser with stim 
[]  Other:  Position: Location:  
 []  Vasopneumatic Device Pressure:       [] lo [] med [] hi  
Temperature: [] lo [] med [] hi  
[x] Skin assessment post-treatment:  [x]intact []redness- no adverse reaction 
  []redness  adverse reaction:  
 
 min []Eval                  []Re-Eval  
 
 
34 min Therapeutic Exercise:  [x] See flow sheet :  
 Rationale: increase ROM and increase strength to improve the patients ability to perform  ADL  
 
 min Therapeutic Activity:  []  See flow sheet :  
Rationale:   to improve the patients ability to  
  
 min Neuromuscular Re-education:  []  See flow sheet :  
Rationale:   to improve the patients ability to  
 
 min Manual Therapy:    
Rationale: decrease pain, increase ROM, increase tissue extensibility and decrease edema  to perform ADL  
 
 min Gait Training:  ___ feet with ___ device on level surfaces with ___ level of assist  
Rationale: With 
 [x] TE 
 [] TA 
 [] neuro 
 [] other: Patient Education: [x] Review HEP [] Progressed/Changed HEP based on:  
[] positioning   [] body mechanics   [] transfers   [] heat/ice application   
[] other:   
 
Other Objective/Functional Measures:  Pt  Questionable  About  Right  LSP  Popping  When he  Performs  Hip flexion  movement Pain Level (0-10 scale) post treatment: 0 
 
ASSESSMENT/Changes in Function: Responded  Fairly well  To  Each there ex. Patient will continue to benefit from skilled PT services to address functional mobility deficits, address ROM deficits, address strength deficits, analyze and address soft tissue restrictions, analyze and cue movement patterns and instruct in home and community integration to attain remaining goals. [x]  See Plan of Care 
[]  See progress note/recertification 
[]  See Discharge Summary Progress towards goals / Updated goals: 
Short Term Goals: To be accomplished in 5 treatments: 
                       3 patient will have established and be I with HEP to aid with progression of skilled PT program 
                       EVAL issued 
                       [id number]: MET- pain with extension exercise 8/17/18 
                       2 patient will have FOTO improved to 66 to show increase tolerance to work demands 
                       EVAL 66 
                       CURRENT 
   
 Long Term Goals: To be accomplished in 10 treatments: 
                       3 patient will have FOTO improved to 72 to show increase tolerance to work demands 
[phone] patient will have pain 0-1/10 to aid with increase tolerance to ADLs and activities at home and work 
                       EVAL 2-3 
[id number]  8/22/18   0   8/27/18 
                       3 patient will have overall 50% improvement per his reports to aid with increase tolerance to work demands and household chores                        EVAL pain with standing and moving and leaning backward 
                       CURRENT 
 
PLAN 
[]  Upgrade activities as tolerated     []  Continue plan of care 
[]  Update interventions per flow sheet      
[]  Discharge due to:_ 
[x]  Other:_  Trial TM  Or  AMbulation EMMA Andujar, [place]AG[place] 8/30/2018  3:19 PM 
 
No future appointments.

## 2018-09-04 ENCOUNTER — HOSPITAL ENCOUNTER (OUTPATIENT)
Dept: PHYSICAL THERAPY | Age: 33
Discharge: HOME | End: 2018-09-04
Payer: OTHER MISCELLANEOUS

## 2018-09-04 PROCEDURE — 97110 THERAPEUTIC EXERCISES: CPT

## 2018-09-04 NOTE — PROGRESS NOTES
PT DAILY TREATMENT NOTE  Patient Name: Jazmine Lu 
Date:2018 : 1985 [x]  Patient  Verified Payor: Lyn Maria Del Rosario / Plan: [address] / Product Type: Workers Comp / In time: 3:10  Out time: 4:02 Total Treatment Time (min): 52 Visit #: 6 of 10 Treatment Area: Sacroiliitis ([address] [M46.1] SUBJECTIVE Pain Level (0-10 scale): 1 Any medication changes, allergies to medications, adverse drug reactions, diagnosis change, or new procedure performed?: [x] No    [] Yes (see summary sheet for update) Subjective functional status/changes:   [] No changes reported Pt reports he is doing better overall. Pt reports having some pain at times. OBJECTIVE Modality rationale: decrease inflammation and decrease pain to improve the patients ability to perform ADLs with less pain Min Type Additional Details  
 [] Estim:  []Unatt       []IFC  []Premod []Other:  []w/ice   []w/heat Position: Location:  
 [] Estim: []Att    []TENS instruct  []NMES []Other:  []w/US   []w/ice   []w/heat Position: Location:  
 []  Traction: [] Cervical       []Lumbar 
                     [] Prone          []Supine []Intermittent   []Continuous Lbs: 
[] before manual 
[] after manual  
 []  Ultrasound: []Continuous   [] Pulsed []1MHz   []3MHz W/cm2: 
Location:  
 []  Iontophoresis with dexamethasone Location: [] Take home patch  
[] In clinic  
10 [x]  Ice     []  heat 
[]  Ice massage 
[]  Laser  
[]  Anodyne Position:long sitting Location:l/s  
 []  Laser with stim 
[]  Other:  Position: Location:  
 []  Vasopneumatic Device Pressure:       [] lo [] med [] hi  
Temperature: [] lo [] med [] hi  
[x] Skin assessment post-treatment:  [x]intact []redness- no adverse reaction 
  []redness  adverse reaction:  
 
42 min Therapeutic Exercise:  [x] See flow sheet :  
 Rationale: increase ROM and increase strength to improve the patients ability to perform  ADLs With 
 [x] TE 
 [] TA 
 [] neuro 
 [] other: Patient Education: [x] Review HEP [] Progressed/Changed HEP based on:  
[] positioning   [] body mechanics   [] transfers   [] heat/ice application   
[] other:   
 
Other Objective/Functional Measures: Attempted TM walking today to improve endurance. Added 2# weights to marches, green tband to hipx3, lateral green tband walks, increased weight to 130#s on leg press, deadbug, bridges to improve strength and stability in the core, back, and LEs. Pain Level (0-10 scale) post treatment: 0 
 
ASSESSMENT/Changes in Function: Reported no pain post session. Equal jayden distrubution noted through the LEs with SB squats. Good glute AROM noted with bridge. Reported having no increased pain with exercises today. Continue POC as tolerated. Patient will continue to benefit from skilled PT services to address functional mobility deficits, address ROM deficits, address strength deficits, analyze and address soft tissue restrictions, analyze and cue movement patterns and instruct in home and community integration to attain remaining goals. []  See Plan of Care 
[]  See progress note/recertification 
[]  See Discharge Summary Progress towards goals / Updated goals: 
Short Term Goals: To be accomplished in 5 treatments: 
                       6 patient will have established and be I with HEP to aid with progression of skilled PT program 
                       EVAL issued 
                       [id number]: MET- pain with extension exercise 8/17/18 
                       2 patient will have FOTO improved to 66 to show increase tolerance to work demands 
                       EVAL 75 [phone] Long Term Goals: To be accomplished in 10 treatments: 
                       2 patient will have FOTO improved to 72 to show increase tolerance to work demands 
                       EVAL [phone] patient will have pain 0-1/10 to aid with increase tolerance to ADLs and activities at home and work 
                       EVAL 2-3 
[id number]  8/22/18   0   8/27/18 
                       3 patient will have overall 50% improvement per his reports to aid with increase tolerance to work demands and household chores                        EVAL pain with standing and moving and leaning backward 
                       CURRENT 
 
PLAN [x]  Upgrade activities as tolerated     [x]  Continue plan of care [x]  Update interventions per flow sheet      
[]  Discharge due to:_ 
[]  Other:_ Constantin Samson, JANEL 9/4/2018  3:11 PM 
 
Future Appointments Date Time Provider Gay Goodman 9/6/2018 5:00 PM Vy Andujar PTA [facility]  
9/11/2018 4:00 PM Constantin Samson PT [facility]  
9/13/2018 9:00 AM Madina Alamo  [address]   
9/13/2018 4:00 PM [place], [place] [facility]  
9/18/2018 4:00 PM Constantin Samson PT [facility]

## 2018-09-06 ENCOUNTER — APPOINTMENT (OUTPATIENT)
Dept: PHYSICAL THERAPY | Age: 33
End: 2018-09-06
Payer: OTHER MISCELLANEOUS

## 2018-09-11 ENCOUNTER — APPOINTMENT (OUTPATIENT)
Dept: PHYSICAL THERAPY | Age: 33
End: 2018-09-11
Payer: OTHER MISCELLANEOUS

## 2018-09-13 ENCOUNTER — APPOINTMENT (OUTPATIENT)
Dept: PHYSICAL THERAPY | Age: 33
End: 2018-09-13
Payer: OTHER MISCELLANEOUS

## 2018-09-18 ENCOUNTER — HOSPITAL ENCOUNTER (OUTPATIENT)
Dept: PHYSICAL THERAPY | Age: 33
Discharge: HOME | End: 2018-09-18
Payer: OTHER MISCELLANEOUS

## 2018-09-18 PROCEDURE — 97110 THERAPEUTIC EXERCISES: CPT

## 2018-09-18 NOTE — PROGRESS NOTES
PT DAILY TREATMENT NOTE  Patient Name: Irene Reddy 
Date:2018 : 1985 [x]  Patient  Verified Payor: Doris Martin / Plan: [id number] / Product Type: Workers Comp / In time: 12:10  Out time:12:46 Total Treatment Time (min): 36 Visit #: 7 of 10 Treatment Area: Sacroiliitis ([address] [M46.1] SUBJECTIVE Pain Level (0-10 scale): 0 Any medication changes, allergies to medications, adverse drug reactions, diagnosis change, or new procedure performed?: [x] No    [] Yes (see summary sheet for update) Subjective functional status/changes:   [] No changes reported No pain. OBJECTIVE Modality rationale: decrease edema, decrease inflammation, decrease pain and increase tissue extensibility to improve the patients ability to perform ADL Min Type Additional Details  
 [] Estim:  []Unatt       []IFC  []Premod []Other:  []w/ice   []w/heat Position: Location:  
 [] Estim: []Att    []TENS instruct  []NMES []Other:  []w/US   []w/ice   []w/heat Position: Location:  
 []  Traction: [] Cervical       []Lumbar 
                     [] Prone          []Supine []Intermittent   []Continuous Lbs: 
[] before manual 
[] after manual  
 []  Ultrasound: []Continuous   [] Pulsed []1MHz   []3MHz W/cm2: 
Location:  
 []  Iontophoresis with dexamethasone Location: [] Take home patch  
[] In clinic  
 []  Ice     []  heat 
[]  Ice massage 
[]  Laser  
[]  Anodyne Position: Location:  
 []  Laser with stim 
[]  Other:  Position: Location:  
 []  Vasopneumatic Device Pressure:       [] lo [] med [] hi  
Temperature: [] lo [] med [] hi  
[x] Skin assessment post-treatment:  [x]intact []redness- no adverse reaction 
  []redness  adverse reaction:  
 
 min []Eval                  []Re-Eval  
 
 
36 min Therapeutic Exercise:  [x] See flow sheet :  
 Rationale: increase ROM and increase strength to improve the patients ability to perform ADL 
 
 min Therapeutic Activity:  []  See flow sheet :  
Rationale:   to improve the patients ability to 
  
 min Neuromuscular Re-education:  []  See flow sheet :  
Rationale:   to improve the patients ability to  
 
 min Manual Therapy:    
Rationale: decrease pain, increase ROM, increase tissue extensibility and decrease edema  to perform ADL 
 
 min Gait Training:  ___ feet with ___ device on level surfaces with ___ level of assist  
Rationale: With 
 [x] TE 
 [] TA 
 [] neuro 
 [] other: Patient Education: [x] Review HEP [] Progressed/Changed HEP based on:  
[] positioning   [] body mechanics   [] transfers   [] heat/ice application   
[] other:   
 
Other Objective/Functional Measures: Added  3#  Marches/increased  Rep  Per  Flow  sheet Pain Level (0-10 scale) post treatment: 0 
 
ASSESSMENT/Changes in Function: Responded  Fairly  Well  To each there ex. Patient will continue to benefit from skilled PT services to address functional mobility deficits, address ROM deficits, address strength deficits, analyze and address soft tissue restrictions, analyze and cue movement patterns and instruct in home and community integration to attain remaining goals. [x]  See Plan of Care 
[]  See progress note/recertification 
[]  See Discharge Summary Progress towards goals / Updated goals: 
Short Term Goals: To be accomplished in 5 treatments: 
                       4 patient will have established and be I with HEP to aid with progression of skilled PT program 
                       EVAL issued 
                       [id number]: MET- pain with extension exercise 8/17/18 
                       2 patient will have FOTO improved to 66 to show increase tolerance to work demands 
                       EVAL 75 [phone] Long Term Goals: To be accomplished in 10 treatments:                        5 patient will have FOTO improved to 72 to show increase tolerance to work demands 
                       EVAL [phone] patient will have pain 0-1/10 to aid with increase tolerance to ADLs and activities at home and work 
                       EVAL 2-3 
[id number]  8/22/18   0   8/27/18 
                       3 patient will have overall 50% improvement per his reports to aid with increase tolerance to work demands and household chores                        EVAL pain with standing and moving and leaning backward 
                       CURRENT 
 
PLAN 
[]  Upgrade activities as tolerated     [x]  Continue plan of care 
[]  Update interventions per flow sheet      
[]  Discharge due to:_ 
[x]  Other:_  REASSESS GOALS MD NOTE Vy Farmer PTA 9/18/2018  12:13 PM 
 
Future Appointments Date Time Provider Gay Goodman 9/20/2018 3:00 PM Kristen Morley PTA [facility]  
9/21/2018 9:30 AM Leonides Askew  [facility]

## 2018-09-20 ENCOUNTER — HOSPITAL ENCOUNTER (OUTPATIENT)
Dept: PHYSICAL THERAPY | Age: 33
Discharge: HOME | End: 2018-09-20
Payer: OTHER MISCELLANEOUS

## 2018-09-20 PROCEDURE — 97110 THERAPEUTIC EXERCISES: CPT

## 2018-09-20 NOTE — PROGRESS NOTES
PT DAILY TREATMENT NOTE  Patient Name: Germaine Griffiths 
Date:2018 : 1985 [x]  Patient  Verified Payor: Ninarosalinda Hanny / Plan: [address] / Product Type: Workers Comp / In time:  3:02 Out time:3:59 Total Treatment Time (min): 55 Visit #: 8 of 10 Treatment Area: Sacroiliitis ([address] [M46.1] SUBJECTIVE Pain Level (0-10 scale): 1 Any medication changes, allergies to medications, adverse drug reactions, diagnosis change, or new procedure performed?: [x] No    [] Yes (see summary sheet for update) Subjective functional status/changes:   [] No changes reported I did  Some  Lifting  Today. OBJECTIVE Modality rationale: decrease edema, decrease inflammation, decrease pain and increase tissue extensibility to improve the patients ability to perform ADL Min Type Additional Details  
 [] Estim:  []Unatt       []IFC  []Premod []Other:  []w/ice   []w/heat Position: Location:  
 [] Estim: []Att    []TENS instruct  []NMES []Other:  []w/US   []w/ice   []w/heat Position: Location:  
 []  Traction: [] Cervical       []Lumbar 
                     [] Prone          []Supine []Intermittent   []Continuous Lbs: 
[] before manual 
[] after manual  
 []  Ultrasound: []Continuous   [] Pulsed []1MHz   []3MHz W/cm2: 
Location:  
 []  Iontophoresis with dexamethasone Location: [] Take home patch  
[] In clinic  
10 [x]  Ice  post   []  heat 
[]  Ice massage 
[]  Laser  
[]  Anodyne Position:long sit Location:(B) LSP []  Laser with stim 
[]  Other:  Position: Location:  
 []  Vasopneumatic Device Pressure:       [] lo [] med [] hi  
Temperature: [] lo [] med [] hi  
[x] Skin assessment post-treatment:  [x]intact []redness- no adverse reaction 
  []redness  adverse reaction:  
 
 min []Eval                  []Re-Eval  
 
 
 45 min Therapeutic Exercise:  [x] See flow sheet :  
Rationale: increase ROM and increase strength to improve the patients ability to perform ADL  
 
 min Therapeutic Activity:  []  See flow sheet :  
Rationale:   to improve the patients ability to  
  
 min Neuromuscular Re-education:  []  See flow sheet :  
Rationale:   to improve the patients ability to  
 
 min Manual Therapy:    
Rationale: decrease pain, increase ROM, increase tissue extensibility and decrease edema  to perform ADL 
 
 min Gait Training:  ___ feet with ___ device on level surfaces with ___ level of assist  
Rationale: With 
 [x] TE 
 [] TA 
 [] neuro 
 [] other: Patient Education: [x] Review HEP [] Progressed/Changed HEP based on:  
[] positioning   [] body mechanics   [] transfers   [] heat/ice application   
[x] other: REASSESS GOALS Other Objective/Functional Measures: FOTO:76 Pain Level (0-10 scale) post treatment: 0 
 
ASSESSMENT/Changes in Function: Mr. Eladio Olivera  Reports  80%  Improvement. Pain on level  0-1. Patient will continue to benefit from skilled PT services to address functional mobility deficits, address ROM deficits, address strength deficits, analyze and address soft tissue restrictions, analyze and cue movement patterns and instruct in home and community integration to attain remaining goals. [x]  See Plan of Care 
[]  See progress note/recertification 
[]  See Discharge Summary Progress towards goals / Updated goals: 
Short Term Goals: To be accomplished in 5 treatments: 
                       0 patient will have established and be I with HEP to aid with progression of skilled PT program 
                       EVAL issued 
                       IDHMLEC: MET- pain with extension exercise 8/17/18 
                       2 patient will have FOTO improved to 66 to show increase tolerance to work demands 
                       EVAL 66 
                       CURRENT  76  9/20/18 Long Term Goals: To be accomplished in 10 treatments: 
                       8 patient will have FOTO improved to 72 to show increase tolerance to work demands 
                       EVAL 66 
                       CURRENT  76  9/20/18 
                       2 patient will have pain 0-1/10 to aid with increase tolerance to ADLs and activities at home and work 
                       EVAL 2-3 
[id number]  8/22/18   0   8/27/18   0-1  9/20/19 
                       3 patient will have overall 50% improvement per his reports to aid with increase tolerance to work demands and household chores                        EVAL pain with standing and moving and leaning backward 
                       CURRENT   80%  9/20/19 PLAN 
[]  Upgrade activities as tolerated     [x]  Continue plan of care 
[]  Update interventions per flow sheet      
[]  Discharge due to:_ 
[x]  Other:_FAX MD NOTE Vy Farmer PTA 9/20/2018  3:32 PM 
 
Future Appointments Date Time Provider Gay Goodman 9/21/2018 9:30 AM Maria Eugenia Rudd  [address]

## 2018-09-21 ENCOUNTER — OFFICE VISIT (OUTPATIENT)
Dept: ORTHOPEDIC SURGERY | Age: 33
End: 2018-09-21

## 2018-09-21 VITALS
HEIGHT: 70 IN | BODY MASS INDEX: 33.04 KG/M2 | SYSTOLIC BLOOD PRESSURE: 139 MMHG | DIASTOLIC BLOOD PRESSURE: 85 MMHG | TEMPERATURE: 97.5 F | OXYGEN SATURATION: 100 % | WEIGHT: 230.8 LBS | HEART RATE: 67 BPM | RESPIRATION RATE: 17 BRPM

## 2018-09-21 DIAGNOSIS — M46.1 BILATERAL SACROILIITIS (HCC): Primary | ICD-10-CM

## 2018-09-21 NOTE — MR AVS SNAPSHOT
[address] [facility] [address] [facility] [address] 
[phone] Patient: Daron Nash MRN: [id number] Visit Information Date & Time Provider Department Dept. Phone Encounter #  
 9/21/2018  9:30 AM Dinorah Sandoval MD [facility] [id number] Follow-up Instructions Return in about 6 weeks (around 11/2/2018). Upcoming Health Maintenance Date Due DTaP/Tdap/Td series (1 - Tdap) 6/5/2007 Influenza Age 5 to Adult 8/1/2018 Allergies as of 9/21/2018  Review Complete On: 9/21/2018 By: Avel Charles LPN Severity Noted Reaction Type Reactions Penicillins  08/07/2018    Other (comments) Patient states he was told during childhood Current Immunizations  Never Reviewed No immunizations on file. Not reviewed this visit You Were Diagnosed With   
  
 Codes Comments [facility]    -  Primary ICD-10-CM: M46.1 ICD-9-CM: 720.2 Vitals BP Pulse Temp Resp Height(growth percentile) Weight(growth percentile) 139/85 67 97.5 °F (36.4 °C) (Oral) 17 5' 10\" (1.778 m) 230 lb 12.8 oz (104.7 kg) SpO2 BMI Smoking Status 100% 33.12 kg/m2 Never Smoker BMI and BSA Data Body Mass Index Body Surface Area  
 33.12 kg/m 2 2.27 m 2 Your Updated Medication List  
  
Notice  As of 9/21/2018 10:39 AM  
 You have not been prescribed any medications. We Performed the Following REFERRAL TO PHYSICAL THERAPY [TXQ11 Custom] Comments:  
 Continue plan of care Add work conditioning Follow-up Instructions Return in about 6 weeks (around 11/2/2018). Referral Information Referral ID Referred By Referred To  
  
 [id number] Marco Acosta [address] [facility]   
   [id number]  Phone: [phone] Visits Status Start Date End Date 1 New Request 9/21/18 9/21/19 If your referral has a status of pending review or denied, additional information will be sent to support the outcome of this decision. Introducing [facility]! [employer] introduces [employer] patient portal. Now you can access parts of your medical record, email your doctor's office, and request medication refills online. 1. In your internet browser, go to [email or web address] 2. Click on the First Time User? Click Here link in the Sign In box. You will see the New Member Sign Up page. 3. Enter your [employer] Access Code exactly as it appears below. You will not need to use this code after youve completed the sign-up process. If you do not sign up before the expiration date, you must request a new code. · [employer] Access Code: [email or web address] Expires: 11/5/2018  9:23 AM 
 
4. Enter the last four digits of your Social Security Number (xxxx) and Date of Birth (mm/dd/yyyy) as indicated and click Submit. You will be taken to the next sign-up page. 5. Create a Cloudike ID. This will be your Cloudike login ID and cannot be changed, so think of one that is secure and easy to remember. 6. Create a [employer] password. You can change your password at any time. 7. Enter your Password Reset Question and Answer. This can be used at a later time if you forget your password. 8. Enter your e-mail address. You will receive e-mail notification when new information is available in [address] 9. Click Sign Up. You can now view and download portions of your medical record. 10. Click the Download Summary menu link to download a portable copy of your medical information. If you have questions, please visit the Frequently Asked Questions section of the [employer] website. Remember, [employer]ike is NOT to be used for urgent needs. For medical emergencies, dial 911. Now available from your iPhone and Android! Please provide this summary of care documentation to your next provider. Your primary care clinician is listed as NONE. If you have any questions after today's visit, please call [phone].

## 2018-09-21 NOTE — PROGRESS NOTES
[address]  [address], [address]  [facility], [address]  Phone: [phone]  Fax: [phone]        Tamy Steff  : 1985  PCP: None  2018    PROGRESS NOTE      ASSESSMENT AND PLAN    Janes Giang comes in to the office today for PT f/u. He notes that his pain has improved but he still experiences pain occasionally in the SI region. Pt states that his pain worsens when he does heavy lifting of irregular objects (50+ lbs). Pt reports that he has lost 30 lbs since his injury with diet. He rates his pain as a 1/10 today. On examination, he had decreased/mild tenderness to palpation of his bilateral SI joints. His residual pain remains due to bilateral sacroiliitis. I recommended that he continue PT, and I added to his referral to include work conditioning. Pt will f/u in 6 weeks or sooner as needed. Diagnoses and all orders for this visit:    1. Bilateral sacroiliitis ([facility] [address]  -     REFERRAL TO PHYSICAL THERAPY       Follow-up Disposition: Not on File      HISTORY OF PRESENT ILLNESS  Janes Giang is a 29 y.o. male. A&P / HPI from 18:  [address] [facility] comes in to the office today c/o axial low back pain following an injury on  when he lifted a trailer. He was evaluated at [facility], where they suspected his pain was likely due to a muscle spasm. His symptoms have not improved much since the injury. His pain worsens movement or lying on the ground and is relieved with sitting. He also has some pain in his abdomen/groin that feels like \"a stomach ache. \" He rates his pain as a 4/10 today.      On examination, he has pain reproduced with lumbar extension. He is neurologically intact. He had no dural tension sign. He had tenderness to palpation of his SI joints bilaterally. He had 2/5 positive provocative testing for sacroiliitis.      His pain is likely sacroiliitis bilaterally.  The referral pattern would explain his groin symptoms and exacerbation with extension. We discussed the option of PT vs SI joint injection. I offered a prednisone dose pack, but he defers at this time. I referred to PT.     Pt will f/u in 6 weeks or sooner if needed. HISTORY OF PRESENT ILLNESS  Kali Moya is a [address] y.o. male c/o axial low back pain after an injury in June. June 22, he lifted a trailer and \"threw out [his] back. \" He went to [facility] for evaluation who took an XR and stated it was likely a muscle spasm. Since his pain has only slightly improved since the injury, he would like further evaluation. He also has some pain in his abdomen/groin that feels like \"a stomach ache. \"     He notes he feels more comfortable lying on his side in bed, but lying on the floor while playing with his kids worsens the pain. He finds relief from sitting in a supportive chair. He has pain when he has bowel movements, but notes the pain is not hindering his function.      He has no hx of back pain prior to the injury.     He works as a .     Pt denies any fevers, chills, nausea, vomiting. Pt denies any chest pain and shortness of breath. Pt denies any ear, nose, and throat problems. Pt denies any fecal or urinary incontinence. Updates from 09/21/18:  Pt presents for PT f/u. He notes that his pain has progressed but he still experiences pain occasionally in his SI region. Pt states that his pain worsens when he does heavy lifting (50+ lbs). Pt experiences lower back pain after a day at PT. Pt reports that he has lost 30 lbs since his injury. Of note, he has changed his diet. PAST MEDICAL HISTORY   History reviewed. No pertinent past medical history. History reviewed. No pertinent surgical history. Magdaleno Duran       MEDICATIONS         ALLERGIES  Allergies   Allergen Reactions    Penicillins Other (comments)     Patient states he was told during childhood          SOCIAL HISTORY    Social History     Social History  Marital status:      Spouse name: N/A    Number of children: N/A    Years of education: N/A     Social History Main Topics    Smoking status: Never Smoker    Smokeless tobacco: Never Used    Alcohol use No    Drug use: None    Sexual activity: Not Asked     Other Topics Concern    None     Social History Narrative       FAMILY HISTORY  Family History   Problem Relation Age of Onset    Headache Mother          REVIEW OF SYSTEMS  Review of Systems   Musculoskeletal:        Bilateral buttock pain          PHYSICAL EXAMINATION  Visit Vitals    /85    Pulse 67    Temp 97.5 °F (36.4 °C) (Oral)    Resp 17    Ht 5' 10\" (1.778 m)    Wt 230 lb 12.8 oz (104.7 kg)    SpO2 100%    BMI 33.12 kg/m2       Pain Assessment  9/21/2018   Location of Pain Back   Severity of Pain 1   Quality of Pain Aching   Frequency of Pain Intermittent   Aggravating Factors -   Aggravating Factors Comment -   Relieving Factors -   Relieving Factors Comment -           Constitutional:  Well developed, well nourished, in no acute distress. Psychiatric: Affect and mood are appropriate. Integumentary: No rashes or abrasions noted on exposed areas. SPINE/MUSCULOSKELETAL EXAM    Cervical spine:  Neck is midline. Normal muscle tone. No focal atrophy is noted. ROM pain free. Shoulder ROM intact. No tenderness to palpation. Negative Spurling's sign. Negative Tinel's sign. Negative Moulton's sign.       Sensation in the bilateral arms grossly intact to light touch.      Lumbar spine:  No rash, ecchymosis, or gross obliquity. No fasciculations. No focal atrophy is noted. No pain with hip ROM. Full range of motion. No tenderness to palpation. No tenderness to palpation at the sciatic notch. SI joints tender bilaterally. Trochanters non tender.      Sensation in the bilateral legs grossly intact to light touch.     Positive CHAVA test on the R>L. Negative P4 bilaterally.   Negative distraction test.  Mildly positive compression test.  Negative Gaenslen test bilaterally. Updates from 9/21/18:  Decreased/mild tenderness to palpation of bilateral SI joints    MOTOR:      Biceps  Triceps Deltoids Wrist Ext Wrist Flex Hand Intrin   Right 5/5 5/5 5/5 5/5 5/5 5/5   Left 5/5 5/5 5/5 5/5 5/5 5/5             Hip Flex  Quads Hamstrings Ankle DF EHL Ankle PF   Right 5/5 5/5 5/5 5/5 5/5 5/5   Left 5/5 5/5 5/5 5/5 5/5 5/5   DTRs are 2+ biceps, triceps, brachioradialis, patella, and Achilles.     Negative Straight Leg raise. Squat not tested. No difficulty with tandem gait.      Ambulation without assistive device. FWB.       RADIOGRAPHS  5V Lumbar XR images taken on 7/3/18 personally reviewed with patient:  There is slight disc space narrowing at L4-5 and L5-S1. No acute abnormality.       reviewed     Mr. Amy Hinkle has a reminder for a \"due or due soon\" health maintenance. I have asked that he contact his primary care provider for follow-up on this health maintenance. 12 minutes of face-to-face contact were spent with the patient during today's visit extensively discussing symptoms and treatment plan. All questions were answered. More than half of this visit today was spent on counseling.      Written by Long Davies as dictated by Bladimir Padilla MD

## 2018-10-22 NOTE — PROGRESS NOTES
[facility] [address] [place] [address], [address] [place], [address] [place] [address],[facility] [address] 
[phone] fax Discharge Summary Patient name: Placido Leo     Start of Care: 18 Referral source: Shasha Blankenship MD    : 1985 Medical/Treatment Diagnosis: Sacroiliitis ([address] [M46.1]  Onset Date:18 Prior Hospitalization: see medical history   Provider#: [id number] Comorbidities: none Prior Level of Function: :I all areas, no AD use,I ADLS and activities, household chores, work demands, community activities Medications: Verified on Patient Summary List 
 
Visits from Start of Care: 8    Missed Visits: 1 Reporting Period : 18 to 18 Summary of Care:Patient seen for 8 skilled sessions. His residual pain was 1 and FOTO 76. He had exercises for his HEP and should follow up with his MD as needed. Thank you. Goal:patient will have established and be I with HEP to aid with progression of skilled PT program 
Status at last note/certification:eval 
Status at discharge: met Goal:patient will have FOTO improved to 66 to show increase tolerance to work demands Status at last note/certification:eval 
Status at discharge: met Goal: patient will have pain 0-1/10 to aid with increase tolerance to ADLs and activities at home and work Status at last note/certification:eval 
Status at discharge: met Goal:patient will have overall 50% improvement per his reports to aid with increase tolerance to work demands and household chores Status at last note/certification:eval 
Status at discharge: met, 80% ASSESSMENT/RECOMMENDATIONS: 
[]Discontinue therapy progressing towards or have reached established goals []Discontinue therapy due to lack of appreciable progress towards goals []Discontinue therapy due to lack of attendance or compliance [x]Other:no further contact Thank you for this referral.  
 
 Whitney Reyes, PT 10/22/2018 4:13 PM